# Patient Record
Sex: MALE | Race: WHITE | NOT HISPANIC OR LATINO | URBAN - NONMETROPOLITAN AREA
[De-identification: names, ages, dates, MRNs, and addresses within clinical notes are randomized per-mention and may not be internally consistent; named-entity substitution may affect disease eponyms.]

---

## 2022-08-12 ENCOUNTER — WEB ENCOUNTER (OUTPATIENT)
Dept: URBAN - NONMETROPOLITAN AREA CLINIC 2 | Facility: CLINIC | Age: 62
End: 2022-08-12

## 2022-08-18 ENCOUNTER — WEB ENCOUNTER (OUTPATIENT)
Dept: URBAN - NONMETROPOLITAN AREA CLINIC 2 | Facility: CLINIC | Age: 62
End: 2022-08-18

## 2022-08-18 ENCOUNTER — CLAIMS CREATED FROM THE CLAIM WINDOW (OUTPATIENT)
Dept: URBAN - NONMETROPOLITAN AREA CLINIC 2 | Facility: CLINIC | Age: 62
End: 2022-08-18

## 2022-08-18 ENCOUNTER — OFFICE VISIT (OUTPATIENT)
Dept: URBAN - NONMETROPOLITAN AREA CLINIC 2 | Facility: CLINIC | Age: 62
End: 2022-08-18

## 2022-08-18 ENCOUNTER — LAB OUTSIDE AN ENCOUNTER (OUTPATIENT)
Dept: URBAN - NONMETROPOLITAN AREA CLINIC 2 | Facility: CLINIC | Age: 62
End: 2022-08-18

## 2022-08-18 ENCOUNTER — DASHBOARD ENCOUNTERS (OUTPATIENT)
Age: 62
End: 2022-08-18

## 2022-08-18 VITALS
DIASTOLIC BLOOD PRESSURE: 60 MMHG | SYSTOLIC BLOOD PRESSURE: 156 MMHG | WEIGHT: 315 LBS | HEART RATE: 89 BPM | BODY MASS INDEX: 42.66 KG/M2 | TEMPERATURE: 97.7 F | HEIGHT: 72 IN

## 2022-08-18 DIAGNOSIS — R19.5 HEME + STOOL: ICD-10-CM

## 2022-08-18 DIAGNOSIS — Z86.010 PERSONAL HISTORY OF COLONIC POLYPS: ICD-10-CM

## 2022-08-18 DIAGNOSIS — Z98.84 GASTRIC BYPASS STATUS FOR OBESITY: ICD-10-CM

## 2022-08-18 DIAGNOSIS — I48.92 ATRIAL FLUTTER, UNSPECIFIED TYPE: ICD-10-CM

## 2022-08-18 DIAGNOSIS — D50.0 IRON DEFICIENCY ANEMIA DUE TO CHRONIC BLOOD LOSS: ICD-10-CM

## 2022-08-18 DIAGNOSIS — Z12.11 COLON CANCER SCREENING: ICD-10-CM

## 2022-08-18 PROBLEM — 5370000: Status: ACTIVE | Noted: 2022-08-18

## 2022-08-18 PROBLEM — 698450007: Status: ACTIVE | Noted: 2022-08-18

## 2022-08-18 PROCEDURE — 99214 OFFICE O/P EST MOD 30 MIN: CPT | Performed by: NURSE PRACTITIONER

## 2022-08-18 PROCEDURE — 99214 OFFICE O/P EST MOD 30 MIN: CPT | Performed by: INTERNAL MEDICINE

## 2022-08-18 RX ORDER — HYDRALAZINE HYDROCHLORIDE 50 MG/1
TAKE 1 TABLET (50 MG) BY ORAL ROUTE 2 TIMES PER DAY WITH FOOD TABLET ORAL 2
Qty: 0 | Refills: 0 | Status: ACTIVE | COMMUNITY
Start: 1900-01-01

## 2022-08-18 RX ORDER — RIVAROXABAN 20 MG/1
TAKE 1 TABLET (20 MG) BY ORAL ROUTE ONCE DAILY WITH THE EVENING MEAL TABLET, FILM COATED ORAL 1
Qty: 0 | Refills: 0 | Status: ACTIVE | COMMUNITY
Start: 1900-01-01

## 2022-08-18 RX ORDER — NADOLOL 40 MG/1
TAKE 2 TABLETS (80 MG) BY ORAL ROUTE ONCE DAILY TABLET ORAL 1
Qty: 0 | Refills: 0 | Status: ACTIVE | COMMUNITY
Start: 1900-01-01

## 2022-08-18 RX ORDER — LISINOPRIL 40 MG/1
TAKE 1 TABLET (40 MG) BY ORAL ROUTE ONCE DAILY TABLET ORAL 1
Qty: 0 | Refills: 0 | Status: ACTIVE | COMMUNITY
Start: 1900-01-01

## 2022-08-18 RX ORDER — ROSUVASTATIN CALCIUM 10 MG/1
TABLET, FILM COATED ORAL
Qty: 0 | Refills: 0 | Status: ACTIVE | COMMUNITY
Start: 1900-01-01

## 2022-08-18 RX ORDER — METFORMIN HYDROCHLORIDE 1000 MG/1
TAKE 1 TABLET (1,000 MG) BY ORAL ROUTE 2 TIMES PER DAY WITH MORNING AND EVENING MEALS TABLET, COATED ORAL 2
Qty: 0 | Refills: 0 | Status: ACTIVE | COMMUNITY
Start: 1900-01-01

## 2022-08-18 RX ORDER — FLUTICASONE PROPIONATE 50 UG/1
SPRAY, METERED NASAL
Qty: 0 | Refills: 0 | Status: ON HOLD | COMMUNITY
Start: 1900-01-01

## 2022-08-18 RX ORDER — AZELASTINE HYDROCHLORIDE 0.5 MG/ML
INSTILL 1 DROP INTO LEFT EYE BY OPHTHALMIC ROUTE 2 TIMES PER DAY SOLUTION/ DROPS OPHTHALMIC 2
Qty: 1 | Refills: 0 | Status: ON HOLD | COMMUNITY
Start: 1900-01-01

## 2022-08-18 RX ORDER — SODIUM PICOSULFATE, MAGNESIUM OXIDE, AND ANHYDROUS CITRIC ACID 10; 3.5; 12 MG/160ML; G/160ML; G/160ML
160ML X 2 AS DIRECTED LIQUID ORAL ONCE
Qty: 320 MILLILITER | Refills: 0 | OUTPATIENT
Start: 2022-08-18 | End: 2022-08-19

## 2022-08-18 RX ORDER — GLIPIZIDE 5 MG/1
TAKE 1 TABLET (5 MG) BY ORAL ROUTE ONCE DAILY BEFORE A MEAL TABLET ORAL 1
Qty: 0 | Refills: 0 | Status: ON HOLD | COMMUNITY
Start: 1900-01-01

## 2022-08-18 NOTE — HPI-TODAY'S VISIT:
8/18/2022 Scottie presents for evaluation of iron deficiency anemia and Hemoccult positive stool.  During his routine lab work he was found to have a mild anemia and a Hemoccult positive stool on digital rectal exam.  Dr. Hernandez sent him home with 3 Hemoccult cards which were all 3 positive.  He has a history of internal hemorrhoids and is on Xarelto for history of atrial flutter.  His last colonoscopy was in 2019 by Dr. Sameer sommer with 1 polyp in the cecum that was a benign lymphoid aggregate and internal hemorrhoids.  Today we have discussed the differential.  I doubt he has colorectal cancer or significant polyps.  However given his positive stool and mild iron deficiency he would like to repeat his colonoscopy.  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  We have discussed how this anatomy may lead to iron deficiency.  If his colonoscopy is normal consider upper endoscopy plus or minus small bowel imaging.  He has been placed on oral iron supplementation.  He may need IV iron supplementation depending on his clinical course and his history of Reza-en-Y gastric bypass.  MB

## 2022-08-24 PROBLEM — 59614000: Status: ACTIVE | Noted: 2022-08-18

## 2022-08-24 PROBLEM — 428283002: Status: ACTIVE | Noted: 2022-08-18

## 2022-08-24 PROBLEM — 724556004: Status: ACTIVE | Noted: 2022-08-18

## 2022-09-22 ENCOUNTER — OFFICE VISIT (OUTPATIENT)
Dept: URBAN - METROPOLITAN AREA MEDICAL CENTER 1 | Facility: MEDICAL CENTER | Age: 62
End: 2022-09-22

## 2022-09-22 ENCOUNTER — LAB OUTSIDE AN ENCOUNTER (OUTPATIENT)
Dept: URBAN - NONMETROPOLITAN AREA CLINIC 2 | Facility: CLINIC | Age: 62
End: 2022-09-22

## 2022-09-22 DIAGNOSIS — D12.2 ADENOMA OF ASCENDING COLON: ICD-10-CM

## 2022-09-22 DIAGNOSIS — Z12.11 COLON CANCER SCREENING: ICD-10-CM

## 2022-09-22 DIAGNOSIS — R19.5 ABNORMAL CONSISTENCY OF STOOL: ICD-10-CM

## 2022-09-22 PROCEDURE — G8907 PT DOC NO EVENTS ON DISCHARG: HCPCS | Performed by: INTERNAL MEDICINE

## 2022-09-22 PROCEDURE — 45385 COLONOSCOPY W/LESION REMOVAL: CPT | Performed by: INTERNAL MEDICINE

## 2022-09-23 LAB
AP CASE REPORT: (no result)
AP FINAL DIAGNOSIS: (no result)
AP GROSS DESCRIPTION: (no result)
AP MICROSCOPIC DESCRIPTION: (no result)

## 2024-09-09 ENCOUNTER — OFFICE VISIT (OUTPATIENT)
Dept: URBAN - NONMETROPOLITAN AREA CLINIC 2 | Facility: CLINIC | Age: 64
End: 2024-09-09
Payer: COMMERCIAL

## 2024-09-09 VITALS
WEIGHT: 300 LBS | BODY MASS INDEX: 40.63 KG/M2 | SYSTOLIC BLOOD PRESSURE: 149 MMHG | HEIGHT: 72 IN | TEMPERATURE: 98 F | HEART RATE: 71 BPM | DIASTOLIC BLOOD PRESSURE: 82 MMHG

## 2024-09-09 DIAGNOSIS — D50.0 IRON DEFICIENCY ANEMIA DUE TO CHRONIC BLOOD LOSS: ICD-10-CM

## 2024-09-09 DIAGNOSIS — Z98.84 GASTRIC BYPASS STATUS FOR OBESITY: ICD-10-CM

## 2024-09-09 DIAGNOSIS — Z86.010 PERSONAL HISTORY OF COLONIC POLYPS: ICD-10-CM

## 2024-09-09 PROCEDURE — 99213 OFFICE O/P EST LOW 20 MIN: CPT

## 2024-09-09 RX ORDER — ROSUVASTATIN CALCIUM 10 MG/1
TABLET, FILM COATED ORAL
Qty: 0 | Refills: 0 | Status: ACTIVE | COMMUNITY
Start: 1900-01-01

## 2024-09-09 RX ORDER — HYDRALAZINE HYDROCHLORIDE 50 MG/1
TAKE 1 TABLET (50 MG) BY ORAL ROUTE 2 TIMES PER DAY WITH FOOD TABLET ORAL 2
Qty: 0 | Refills: 0 | Status: ACTIVE | COMMUNITY
Start: 1900-01-01

## 2024-09-09 RX ORDER — LISINOPRIL 40 MG/1
TAKE 1 TABLET (40 MG) BY ORAL ROUTE ONCE DAILY TABLET ORAL 1
Qty: 0 | Refills: 0 | Status: ACTIVE | COMMUNITY
Start: 1900-01-01

## 2024-09-09 RX ORDER — METFORMIN HYDROCHLORIDE 1000 MG/1
TAKE 1 TABLET (1,000 MG) BY ORAL ROUTE 2 TIMES PER DAY WITH MORNING AND EVENING MEALS TABLET, COATED ORAL 2
Qty: 0 | Refills: 0 | Status: ACTIVE | COMMUNITY
Start: 1900-01-01

## 2024-09-09 RX ORDER — NADOLOL 40 MG/1
TAKE 2 TABLETS (80 MG) BY ORAL ROUTE ONCE DAILY TABLET ORAL 1
Qty: 0 | Refills: 0 | Status: ACTIVE | COMMUNITY
Start: 1900-01-01

## 2024-09-09 RX ORDER — RIVAROXABAN 20 MG/1
TAKE 1 TABLET (20 MG) BY ORAL ROUTE ONCE DAILY WITH THE EVENING MEAL TABLET, FILM COATED ORAL 1
Qty: 0 | Refills: 0 | Status: ACTIVE | COMMUNITY
Start: 1900-01-01

## 2024-09-09 NOTE — HPI-TODAY'S VISIT:
8/18/2022 Scottie presents for evaluation of iron deficiency anemia and Hemoccult positive stool.  During his routine lab work he was found to have a mild anemia and a Hemoccult positive stool on digital rectal exam.  Dr. Hernandez sent him home with 3 Hemoccult cards which were all 3 positive.  He has a history of internal hemorrhoids and is on Xarelto for history of atrial flutter.  His last colonoscopy was in 2019 by Dr. Sameer sommer with 1 polyp in the cecum that was a benign lymphoid aggregate and internal hemorrhoids.  Today we have discussed the differential.  I doubt he has colorectal cancer or significant polyps.  However given his positive stool and mild iron deficiency he would like to repeat his colonoscopy.  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  We have discussed how this anatomy may lead to iron deficiency.  If his colonoscopy is normal consider upper endoscopy plus or minus small bowel imaging.  He has been placed on oral iron supplementation.  He may need IV iron supplementation depending on his clinical course and his history of Reza-en-Y gastric bypass.  MB  9/9/2024 Patient returns , following colonoscopy 9/2022 with Dr. Shelton with findings of TA polyps, removed due to repeat in 5 yrs.  Patient denies nausea, vomiting, abdominal pain, heartburn, diarrhea, melena, hematochezia, anemia and weight loss. Hemoglobin stable on Xarelto with no GI bleeding- hgb 12.5, 12.3 over last year SP

## 2024-09-09 NOTE — PHYSICAL EXAM EYES:
Conjuntivae and eyelids appear normal , Sclerae : White without injection Epidermal Closure: running

## 2024-10-28 ENCOUNTER — OFFICE VISIT (OUTPATIENT)
Dept: URBAN - NONMETROPOLITAN AREA CLINIC 2 | Facility: CLINIC | Age: 64
End: 2024-10-28

## 2025-01-06 ENCOUNTER — OFFICE VISIT (OUTPATIENT)
Dept: URBAN - NONMETROPOLITAN AREA CLINIC 2 | Facility: CLINIC | Age: 65
End: 2025-01-06